# Patient Record
Sex: FEMALE | Race: WHITE | URBAN - METROPOLITAN AREA
[De-identification: names, ages, dates, MRNs, and addresses within clinical notes are randomized per-mention and may not be internally consistent; named-entity substitution may affect disease eponyms.]

---

## 2018-05-17 ENCOUNTER — EMERGENCY (EMERGENCY)
Facility: HOSPITAL | Age: 33
LOS: 1 days | Discharge: HOME | End: 2018-05-17
Attending: EMERGENCY MEDICINE | Admitting: EMERGENCY MEDICINE
Payer: COMMERCIAL

## 2018-05-17 VITALS
OXYGEN SATURATION: 98 % | HEART RATE: 92 BPM | WEIGHT: 95.02 LBS | DIASTOLIC BLOOD PRESSURE: 78 MMHG | TEMPERATURE: 98 F | RESPIRATION RATE: 18 BRPM | SYSTOLIC BLOOD PRESSURE: 111 MMHG

## 2018-05-17 DIAGNOSIS — R42 DIZZINESS AND GIDDINESS: ICD-10-CM

## 2018-05-17 DIAGNOSIS — R53.1 WEAKNESS: ICD-10-CM

## 2018-05-17 DIAGNOSIS — R20.2 PARESTHESIA OF SKIN: ICD-10-CM

## 2018-05-17 DIAGNOSIS — K59.00 CONSTIPATION, UNSPECIFIED: ICD-10-CM

## 2018-05-17 DIAGNOSIS — R11.0 NAUSEA: ICD-10-CM

## 2018-05-17 LAB
ALBUMIN SERPL ELPH-MCNC: 4.1 G/DL — SIGNIFICANT CHANGE UP (ref 3.4–5)
ALP SERPL-CCNC: 34 U/L — LOW (ref 40–120)
ALT FLD-CCNC: 40 U/L — SIGNIFICANT CHANGE UP (ref 12–42)
AMYLASE P1 CFR SERPL: 39 U/L — SIGNIFICANT CHANGE UP (ref 25–115)
ANION GAP SERPL CALC-SCNC: 10 MMOL/L — SIGNIFICANT CHANGE UP (ref 9–16)
APPEARANCE UR: CLEAR — SIGNIFICANT CHANGE UP
APTT BLD: 35.7 SEC — SIGNIFICANT CHANGE UP (ref 27.5–36.5)
AST SERPL-CCNC: 24 U/L — SIGNIFICANT CHANGE UP (ref 15–37)
BILIRUB SERPL-MCNC: 0.4 MG/DL — SIGNIFICANT CHANGE UP (ref 0.2–1.2)
BILIRUB UR-MCNC: NEGATIVE — SIGNIFICANT CHANGE UP
BUN SERPL-MCNC: 12 MG/DL — SIGNIFICANT CHANGE UP (ref 7–23)
CALCIUM SERPL-MCNC: 9.4 MG/DL — SIGNIFICANT CHANGE UP (ref 8.5–10.5)
CHLORIDE SERPL-SCNC: 102 MMOL/L — SIGNIFICANT CHANGE UP (ref 96–108)
CO2 SERPL-SCNC: 26 MMOL/L — SIGNIFICANT CHANGE UP (ref 22–31)
COLOR SPEC: YELLOW — SIGNIFICANT CHANGE UP
CREAT SERPL-MCNC: 0.99 MG/DL — SIGNIFICANT CHANGE UP (ref 0.5–1.3)
DIFF PNL FLD: NEGATIVE — SIGNIFICANT CHANGE UP
GLUCOSE SERPL-MCNC: 101 MG/DL — HIGH (ref 70–99)
GLUCOSE UR QL: NEGATIVE — SIGNIFICANT CHANGE UP
HCG UR QL: NEGATIVE — SIGNIFICANT CHANGE UP
HCT VFR BLD CALC: 38.6 % — SIGNIFICANT CHANGE UP (ref 34.5–45)
HGB BLD-MCNC: 12.9 G/DL — SIGNIFICANT CHANGE UP (ref 11.5–15.5)
HIV 1 & 2 AB SERPL IA.RAPID: SIGNIFICANT CHANGE UP
INR BLD: 0.93 — SIGNIFICANT CHANGE UP (ref 0.88–1.16)
KETONES UR-MCNC: 15 MG/DL
LACTATE SERPL-SCNC: 0.6 MMOL/L — SIGNIFICANT CHANGE UP (ref 0.4–2)
LEUKOCYTE ESTERASE UR-ACNC: NEGATIVE — SIGNIFICANT CHANGE UP
LIDOCAIN IGE QN: 250 U/L — SIGNIFICANT CHANGE UP (ref 73–393)
MAGNESIUM SERPL-MCNC: 1.9 MG/DL — SIGNIFICANT CHANGE UP (ref 1.6–2.6)
MCHC RBC-ENTMCNC: 30.6 PG — SIGNIFICANT CHANGE UP (ref 27–34)
MCHC RBC-ENTMCNC: 33.4 G/DL — SIGNIFICANT CHANGE UP (ref 32–36)
MCV RBC AUTO: 91.5 FL — SIGNIFICANT CHANGE UP (ref 80–100)
NITRITE UR-MCNC: NEGATIVE — SIGNIFICANT CHANGE UP
PCP SPEC-MCNC: SIGNIFICANT CHANGE UP
PH UR: 7 — SIGNIFICANT CHANGE UP (ref 5–8)
PLATELET # BLD AUTO: 278 K/UL — SIGNIFICANT CHANGE UP (ref 150–400)
POTASSIUM SERPL-MCNC: 4 MMOL/L — SIGNIFICANT CHANGE UP (ref 3.5–5.3)
POTASSIUM SERPL-SCNC: 4 MMOL/L — SIGNIFICANT CHANGE UP (ref 3.5–5.3)
PROT SERPL-MCNC: 7.5 G/DL — SIGNIFICANT CHANGE UP (ref 6.4–8.2)
PROT UR-MCNC: NEGATIVE MG/DL — SIGNIFICANT CHANGE UP
PROTHROM AB SERPL-ACNC: 10.2 SEC — SIGNIFICANT CHANGE UP (ref 9.8–12.7)
RBC # BLD: 4.22 M/UL — SIGNIFICANT CHANGE UP (ref 3.8–5.2)
RBC # FLD: 11.9 % — SIGNIFICANT CHANGE UP (ref 10.3–16.9)
SODIUM SERPL-SCNC: 138 MMOL/L — SIGNIFICANT CHANGE UP (ref 132–145)
SP GR SPEC: 1.01 — SIGNIFICANT CHANGE UP (ref 1–1.03)
TSH SERPL-MCNC: 1.53 UIU/ML — SIGNIFICANT CHANGE UP (ref 0.36–3.74)
UROBILINOGEN FLD QL: 0.2 E.U./DL — SIGNIFICANT CHANGE UP
WBC # BLD: 5 K/UL — SIGNIFICANT CHANGE UP (ref 3.8–10.5)
WBC # FLD AUTO: 5 K/UL — SIGNIFICANT CHANGE UP (ref 3.8–10.5)

## 2018-05-17 PROCEDURE — 99284 EMERGENCY DEPT VISIT MOD MDM: CPT

## 2018-05-17 NOTE — ED ADULT NURSE NOTE - CHIEF COMPLAINT QUOTE
pt was on flagyl (10 days)  and xifaxan (2 weeks) for small intestine bacterial overgrowth and potential parasite and reports tingling throughout body, dizziness, and nausea and thinks it could be a reaction. sent in from urgent care for further evaluation due to patient wanting results soon and not 5 days. denies fever/chills, urinary s/s.

## 2018-05-17 NOTE — ED ADULT TRIAGE NOTE - CHIEF COMPLAINT QUOTE
pt was on flagyl (10 days)  and xifaxan (2 weeks) for small intestine bacterial overgrowth and potential parasite and reports tingling throughout body and thinks it could be a reaction. sent in from urgent care for further evaluation due to patient wanting results soon and not 5 days. denies fever/chills, urinary s/s. pt was on flagyl (10 days)  and xifaxan (2 weeks) for small intestine bacterial overgrowth and potential parasite and reports tingling throughout body, dizziness, and nausea and thinks it could be a reaction. sent in from urgent care for further evaluation due to patient wanting results soon and not 5 days. denies fever/chills, urinary s/s.

## 2018-05-17 NOTE — ED PROVIDER NOTE - OBJECTIVE STATEMENT
33y F presents to ED for medical evaluation. Pt states she was taking a 10-day course of flagyl and a two-week course of Xifaxan for small intestine bacterial overgrowth and potential parasite. Pt states that after 8 days of taking flagyl, she began to experience a pressure-like sensation in her throat, as well as nausea, dizziness, and "feeling foggy"". Pt was instructed by PMD to stop taking flagyl. Finished course of Xifaxan 2 days ago. Pt states that in the last 24 hours, pt has been feeling tingling throughout 33y F presents to ED for medical evaluation. Pt states she was taking a 10-day course of flagyl and a two-week course of Xifaxan for small intestine bacterial overgrowth and potential parasite. Pt states that after 8 days of taking flagyl (5 days ago), she began to experience a pressure-like sensation in her throat, as well as nausea, dizziness, and "feeling foggy"". Pt was instructed by PMD to stop taking flagyl. Finished course of Xifaxan 2 days ago. Pt states that in the last 24 hours, pt has been feeling tingling throughout her body, as well as generalized weakness, and feeling "jittery, as if I just had a lot of caffeine". Also notes constipation. Pt has an appointment with GYN tomorrow. Denies fevers/chills. 33y F recently moved from [place] to [facility] presents to ED for medical evaluation. Pt states she was taking a 10-day course of flagyl and a two-week course of Xifaxan for small intestine bacterial overgrowth and potential parasite. Pt states that after 8 days of taking flagyl (5 days ago), she began to experience throat discomfort, as well as nausea, dizziness, constipation and "feeling foggy". Pt was instructed by PMD to stop taking flagyl. Finished course of Xifaxan 2 days ago. Pt states that in the last 24 hours, pt has been feeling tingling throughout her body, as well as generalized weakness, and feeling "jittery, as if I just had a lot of caffeine". Requesting that her thyroid be checked, as she was recently told she had an iodind deficiency. Pt has an appointment with GYN tomorrow. Pt has PMD in [facility]. Denies fevers/chills.

## 2018-05-17 NOTE — ED PROVIDER NOTE - MEDICAL DECISION MAKING DETAILS
Will check labs, including thyroid. HIV test ordered. Labs ordered, including thyroid, rapid HIV, UA.

## 2018-05-17 NOTE — ED PROVIDER NOTE - CHPI ED SYMPTOMS POS
NAUSEA/generalized weakness, feeling "jittery"/DIZZINESS NAUSEA/generalized weakness, feeling "jittery"/CONSTIPATION/DIZZINESS
